# Patient Record
Sex: MALE | Race: OTHER | NOT HISPANIC OR LATINO | Employment: FULL TIME | URBAN - METROPOLITAN AREA
[De-identification: names, ages, dates, MRNs, and addresses within clinical notes are randomized per-mention and may not be internally consistent; named-entity substitution may affect disease eponyms.]

---

## 2023-05-24 ENCOUNTER — OFFICE VISIT (OUTPATIENT)
Dept: GASTROENTEROLOGY | Facility: CLINIC | Age: 43
End: 2023-05-24
Payer: COMMERCIAL

## 2023-05-24 VITALS
WEIGHT: 198 LBS | DIASTOLIC BLOOD PRESSURE: 92 MMHG | HEIGHT: 74 IN | BODY MASS INDEX: 25.41 KG/M2 | SYSTOLIC BLOOD PRESSURE: 134 MMHG | HEART RATE: 98 BPM

## 2023-05-24 DIAGNOSIS — R10.10 UPPER ABDOMINAL PAIN: Primary | ICD-10-CM

## 2023-05-24 DIAGNOSIS — R14.0 BLOATING: ICD-10-CM

## 2023-05-24 DIAGNOSIS — Z86.010 HISTORY OF COLON POLYPS: ICD-10-CM

## 2023-05-24 DIAGNOSIS — R68.81 EARLY SATIETY: ICD-10-CM

## 2023-05-24 PROCEDURE — 99204 OFFICE O/P NEW MOD 45 MIN: CPT | Mod: S$PBB,,, | Performed by: INTERNAL MEDICINE

## 2023-05-24 PROCEDURE — 99999 PR PBB SHADOW E&M-NEW PATIENT-LVL III: ICD-10-PCS | Mod: PBBFAC,,, | Performed by: INTERNAL MEDICINE

## 2023-05-24 PROCEDURE — 99204 PR OFFICE/OUTPT VISIT, NEW, LEVL IV, 45-59 MIN: ICD-10-PCS | Mod: S$PBB,,, | Performed by: INTERNAL MEDICINE

## 2023-05-24 PROCEDURE — 99999 PR PBB SHADOW E&M-NEW PATIENT-LVL III: CPT | Mod: PBBFAC,,, | Performed by: INTERNAL MEDICINE

## 2023-05-24 RX ORDER — PANTOPRAZOLE SODIUM 40 MG/1
1 TABLET, DELAYED RELEASE ORAL
COMMUNITY
Start: 2023-05-04

## 2023-05-25 ENCOUNTER — TELEPHONE (OUTPATIENT)
Dept: ENDOSCOPY | Facility: HOSPITAL | Age: 43
End: 2023-05-25

## 2023-05-25 NOTE — PROGRESS NOTES
[facility] Consultation Note    Reason for Consult:    Chief Complaint   Patient presents with    GI Problem       PCP:   Primary Doctor No    Referring MD:  Yasemin Self  No address on file      HPI:  Clay Mccallum is a 42 y.o. male here for evaluation of abdominal pain and bloating.  He is new to my clinic.  He was seen by his primary care physician twice in April for evaluation of upper abdominal pain that radiated to the right upper quadrant associated with bloating and fatigue.  He was treated with PPI.  He took Protonix for a month without improvement symptoms.  He took it every day.  An abdominal ultrasound was performed on April 20th that was generally unrevealing for cause of symptoms.  He was subsequently referred to us for further evaluation.  His brother-in-law is one of our neurologists who asked me to see him.    He reports ongoing symptoms with bloating and abdominal pain.  The pain occurs in the epigastric area and occasionally radiates to the right upper quadrant.  This had been sporadic starting a couple of years ago, but became constant in the past couple of months.  This is a daily problem now.  Feels fatigued and lethargic at times.  His eating patterns and routines have not changed.  Other than Protonix, he also tried Gas-X which did not help much.  Admits to some early satiety.  He has occasional acid reflux this started a couple of years ago.  This only occurs every few months.  Also has occasional constipation that occurs about once per month.  Constipation is not related to abdominal symptoms.  Uses Advil once every 2-4 weeks due to a herniated disc in his back.  He is a history of hemorrhoids with intermittent bleeding, but these did not require procedural intervention.  He treated these with topical medications and fiber supplements.  He denies current problems with his hemorrhoids.  He has a 3-year-old child at home.  He has experienced several GI illnesses  "since his child started going to .    He saw a GI doctor in [place] couple of years ago to request a colonoscopy.  He reports that a polyp was found during the colonoscopy, and he was told it was benign.  He was not told when to follow-up.  He can not recall the name of the gastroenterologist that performed the procedure.          ROS:  Constitutional: No fevers, chills, No weight loss, normal appetite  ENT: No congestion, rhinorrhea, or chronic sinus problems  CV: No chest pain or palpitations  Pulm: No cough, No shortness of breath  Ophtho: No vision changes or pain  GI: see HPI  Derm: No rash or lesions  MSK: No arthritis or joint swelling  : No dysuria, No frequent urination      Medical History:  has a past medical history of Herniated lumbar intervertebral disc.    Surgical History:  has a past surgical history that includes Vasectomy.    Family History: family history includes Colon polyps in his mother; Dementia in his maternal grandmother; Diabetes in his paternal grandfather and paternal grandmother; Heart failure in his maternal grandmother; No Known Problems in his father; Skin cancer in his paternal grandfather.    Social History:  reports that he has never smoked. He has never been exposed to tobacco smoke. He has never used smokeless tobacco. He reports current alcohol use. He reports that he does not use drugs.    Review of patient's allergies indicates:  No Known Allergies    Prior to Admission medications    Medication Sig Start Date End Date Taking? Authorizing Provider   pantoprazole (PROTONIX) 40 MG tablet Take 1 tablet by mouth before breakfast. 5/4/23   Historical Provider       Objective Findings:  Vital Signs:  BP (!) 134/92   Pulse 98   Ht 6' 2" (1.88 m)   Wt 89.8 kg (198 lb)   BMI 25.42 kg/m²   Body mass index is 25.42 kg/m².      Physical Exam:  General Appearance:  Well appearing in no acute distress, appears stated age  Head:  Normocephalic, atraumatic  Eyes:  No scleral " icterus or pallor, EOMI  Abdomen:  Soft, non tender, non distended. No hepatosplenomegaly, ascites, or mass  Extremities:  No clubbing, cyanosis, or edema  Skin:  No rash  Neurologic:  AAO x 4; CN II-XII intact        Labs:  Outside labs reviewed, dated 04/06/2023.  CBC, CMP, lipase, amylase, CRP, and TSH are within normal limits.            Imaging:  Ultrasound of the abdomen done 04/20/2023:  FINDINGS:  Pancreas: Visualized portions normal.   Liver length: 16 cm.   Liver appearance: Mild steatosis. No biliary ductal dilatation.   CBD: 4 mm.   Gallbladder: Normal.   Portal Vein: Patent with antegrade flow.   Right kidney length: 11 cm.   Right kidney appearance: Normal.   Left kidney length: 11 cm.   Left kidney appearance: Normal.   Spleen: 11 cm, homogeneous in appearance.   Aorta: Normal.   IVC: Normal.   Other findings: None.     IMPRESSION:   1.   Mild hepatic steatosis. Correlate with transaminases per                    Assessment:  Clay Mccallum is a 42 y.o. male with:  1. Upper abdominal pain    2. Bloating    3. Early satiety    4. History of colon polyps      Gradual onset of upper abdominal discomfort in the epigastric to right upper quadrant area associated with bloating and early satiety that progressively worsened over the past couple of years.  Symptoms have become daily and persistent problem over the past couple of months.  No clear relationship to any specific foods or food triggers.  He has had no changes in his daily routines.  He only has rare NSAID use.  Also with rare symptoms of GERD.  Symptoms not related to bowel movements or bowel patterns, and generally has normal bowel movements with only intermittent constipation once a month.  No improvement with use of Protonix daily for a month.  No improvement with use of Gas-X.  A routine laboratory evaluation and ultrasound of the abdomen were unremarkable for cause of symptoms.  Mild steatosis was suggested, but this is nonspecific finding in  this setting.  His liver chemistries are within normal limits.  I do not appreciate any significant underlying liver disease or evidence of liver disease.  He notes reason frequency of gastrointestinal infections since his child started attending .  It is possible that recurrent GI infections have resulted in lingering and worsening symptoms over time.  SIBO is a consideration.  No significant family history of GI disorders.    He notes a history of a colon polyp that was removed during a colonoscopy a couple of years ago.  I do not have that report to review at this time.        Recommendations/Plan:  1. I will get a celiac serology panel  2. We will attempt to get records of his prior colonoscopy  3. I will arrange for an EGD      Follow-up pending results of above      Order summary:  Orders Placed This Encounter    Celiac Disease Panel         Thank you so much for allowing me to participate in the care of Clay Long MD

## 2023-05-25 NOTE — TELEPHONE ENCOUNTER
"Contacted the patient to schedule their procedure(s). The patient did not answer the call and left a voice message asking them to contact Endoscopy Scheduling as soon as possible at [phone].    Unable to Reach You Letter also sent    Perry Long MD  [facility]  Procedure: EGD     Diagnosis: Abdominal pain, bloating, and early satiety     Procedure Timin-4 weeks     *If within 4 weeks selected, please emiliana as high priority*     *If greater than 12 weeks, please select "5-12 weeks" and delay sending until 2 months prior to requested date*     Provider: Myself     Location: No Preference     Additional Scheduling Information: No scheduling concerns     Prep Specifications:N/A                   "

## 2023-06-14 ENCOUNTER — TELEPHONE (OUTPATIENT)
Dept: ENDOSCOPY | Facility: HOSPITAL | Age: 43
End: 2023-06-14
Payer: COMMERCIAL

## 2023-06-14 NOTE — TELEPHONE ENCOUNTER
Contacted patient regarding procedure(s) requested by referring provider, Dr. Long. Patient did not answer. Left message for the patient to return call to my direct number at [phone].

## 2023-06-20 ENCOUNTER — TELEPHONE (OUTPATIENT)
Dept: ENDOSCOPY | Facility: HOSPITAL | Age: 43
End: 2023-06-20
Payer: COMMERCIAL

## 2023-06-20 NOTE — TELEPHONE ENCOUNTER
Contacted the patient to schedule an EGD requested by Dr Long. The patient did not answer, I did  leave a voice message asking them to contact Endoscopy Scheduling  at [phone].

## 2023-06-22 ENCOUNTER — TELEPHONE (OUTPATIENT)
Dept: ENDOSCOPY | Facility: HOSPITAL | Age: 43
End: 2023-06-22
Payer: COMMERCIAL

## 2023-07-26 ENCOUNTER — TELEPHONE (OUTPATIENT)
Dept: ADMINISTRATIVE | Facility: HOSPITAL | Age: 43
End: 2023-07-26
Payer: COMMERCIAL

## 2023-07-27 ENCOUNTER — PATIENT MESSAGE (OUTPATIENT)
Dept: DERMATOLOGY | Facility: CLINIC | Age: 43
End: 2023-07-27

## 2023-07-27 ENCOUNTER — OFFICE VISIT (OUTPATIENT)
Dept: DERMATOLOGY | Facility: CLINIC | Age: 43
End: 2023-07-27
Payer: COMMERCIAL

## 2023-07-27 DIAGNOSIS — D18.01 CHERRY ANGIOMA: ICD-10-CM

## 2023-07-27 DIAGNOSIS — L70.9 ACNE, UNSPECIFIED ACNE TYPE: ICD-10-CM

## 2023-07-27 DIAGNOSIS — L82.1 SEBORRHEIC KERATOSES: ICD-10-CM

## 2023-07-27 DIAGNOSIS — D23.9 DERMATOFIBROMA: ICD-10-CM

## 2023-07-27 DIAGNOSIS — D48.5 NEOPLASM OF UNCERTAIN BEHAVIOR OF SKIN: Primary | ICD-10-CM

## 2023-07-27 DIAGNOSIS — D22.9 MULTIPLE BENIGN NEVI: ICD-10-CM

## 2023-07-27 DIAGNOSIS — L43.9 LICHEN PLANUS: ICD-10-CM

## 2023-07-27 PROCEDURE — 1159F PR MEDICATION LIST DOCUMENTED IN MEDICAL RECORD: ICD-10-PCS | Mod: CPTII,S$GLB,, | Performed by: STUDENT IN AN ORGANIZED HEALTH CARE EDUCATION/TRAINING PROGRAM

## 2023-07-27 PROCEDURE — 99204 PR OFFICE/OUTPT VISIT, NEW, LEVL IV, 45-59 MIN: ICD-10-PCS | Mod: 25,S$GLB,, | Performed by: STUDENT IN AN ORGANIZED HEALTH CARE EDUCATION/TRAINING PROGRAM

## 2023-07-27 PROCEDURE — 88342 IMHCHEM/IMCYTCHM 1ST ANTB: CPT | Performed by: PATHOLOGY

## 2023-07-27 PROCEDURE — 99204 OFFICE O/P NEW MOD 45 MIN: CPT | Mod: 25,S$GLB,, | Performed by: STUDENT IN AN ORGANIZED HEALTH CARE EDUCATION/TRAINING PROGRAM

## 2023-07-27 PROCEDURE — 99999 PR PBB SHADOW E&M-EST. PATIENT-LVL III: CPT | Mod: PBBFAC,,, | Performed by: STUDENT IN AN ORGANIZED HEALTH CARE EDUCATION/TRAINING PROGRAM

## 2023-07-27 PROCEDURE — 88342 CHG IMMUNOCYTOCHEMISTRY: ICD-10-PCS | Mod: 26,,, | Performed by: PATHOLOGY

## 2023-07-27 PROCEDURE — 99999 PR PBB SHADOW E&M-EST. PATIENT-LVL III: ICD-10-PCS | Mod: PBBFAC,,, | Performed by: STUDENT IN AN ORGANIZED HEALTH CARE EDUCATION/TRAINING PROGRAM

## 2023-07-27 PROCEDURE — 1160F RVW MEDS BY RX/DR IN RCRD: CPT | Mod: CPTII,S$GLB,, | Performed by: STUDENT IN AN ORGANIZED HEALTH CARE EDUCATION/TRAINING PROGRAM

## 2023-07-27 PROCEDURE — 1160F PR REVIEW ALL MEDS BY PRESCRIBER/CLIN PHARMACIST DOCUMENTED: ICD-10-PCS | Mod: CPTII,S$GLB,, | Performed by: STUDENT IN AN ORGANIZED HEALTH CARE EDUCATION/TRAINING PROGRAM

## 2023-07-27 PROCEDURE — 88342 IMHCHEM/IMCYTCHM 1ST ANTB: CPT | Mod: 26,,, | Performed by: PATHOLOGY

## 2023-07-27 PROCEDURE — 11102 TANGNTL BX SKIN SINGLE LES: CPT | Mod: S$GLB,,, | Performed by: STUDENT IN AN ORGANIZED HEALTH CARE EDUCATION/TRAINING PROGRAM

## 2023-07-27 PROCEDURE — 88305 TISSUE EXAM BY PATHOLOGIST: ICD-10-PCS | Mod: 26,,, | Performed by: PATHOLOGY

## 2023-07-27 PROCEDURE — 1159F MED LIST DOCD IN RCRD: CPT | Mod: CPTII,S$GLB,, | Performed by: STUDENT IN AN ORGANIZED HEALTH CARE EDUCATION/TRAINING PROGRAM

## 2023-07-27 PROCEDURE — 88305 TISSUE EXAM BY PATHOLOGIST: CPT | Mod: 26,,, | Performed by: PATHOLOGY

## 2023-07-27 PROCEDURE — 11102 PR TANGENTIAL BIOPSY, SKIN, SINGLE LESION: ICD-10-PCS | Mod: S$GLB,,, | Performed by: STUDENT IN AN ORGANIZED HEALTH CARE EDUCATION/TRAINING PROGRAM

## 2023-07-27 PROCEDURE — 88305 TISSUE EXAM BY PATHOLOGIST: CPT | Performed by: PATHOLOGY

## 2023-07-27 RX ORDER — TACROLIMUS 1 MG/G
OINTMENT TOPICAL 2 TIMES DAILY
Qty: 30 G | Refills: 2 | Status: SHIPPED | OUTPATIENT
Start: 2023-07-27 | End: 2023-08-25 | Stop reason: SDUPTHER

## 2023-07-27 NOTE — PATIENT INSTRUCTIONS
Shave Biopsy Wound Care    Your doctor has performed a shave biopsy today.  A band aid and vaseline ointment has been placed over the site.  This should remain in place for NO LONGER THAN 48 hours.  It is fine to remove the bandaid after 24 hours, if the area is no longer bleeding. It is recommended that you keep the area dry (do not wet)) for the first 24 hours.  After 24 hours, wash the area with warm soap and water and apply Vaseline jelly.  Many patients prefer to use Neosporin or Bacitracin ointment.  This is acceptable; however, know that you can develop an allergy to this medication even if you have used it safely for years.  It is important to keep the area moist.  Letting it dry out and get air slows healing time, and will worsen the scar.        If you notice increasing redness, tenderness, pain, or yellow drainage at the biopsy site, please notify your doctor.  These are signs of an infection.    If your biopsy site is bleeding, apply firm pressure for 15 minutes straight.  Repeat for another 15 minutes, if it is still bleeding.   If the surgical site continues to bleed, then please contact your doctor.      For [facility] users:   You will receive your biopsy results in [facility] as soon as they are available. Please be assured that your physician/provider will review your results and will then determine what further treatment, evaluation, or planning is required. You should be contacted by your physician's/provider's office within 5 business days of receiving your results; If not, please reach out to directly. This is one more way [employer] is putting you first.     [facility] [place] [address]/ [phone] FAX/ [email or web address]

## 2023-07-27 NOTE — PROGRESS NOTES
Subjective:      Patient ID:  Clay Mccallum is a 42 y.o. male who presents for   Chief Complaint   Patient presents with    Skin Check     TBSE     History of Present Illness: The patient presents to [facility] care.     Hx of skin cancer on paternal side.     Other skin complaints: Patient with new complaint of lesion(s)  Location: spot on groin  Duration: 3 weeks  Symptoms: none  Relieving factors/Previous treatments: none    Patient with new complaint of lesion(s)  Location: acne on chest and back  Duration: since age 15-16  Symptoms: n/a  Relieving factors/Previous treatments: body wash with salicylic acid           Review of Systems   Constitutional:  Negative for fever, chills and fatigue.   Skin:  Positive for activity-related sunscreen use and wears hat. Negative for daily sunscreen use and recent sunburn.   Hematologic/Lymphatic: Bruises/bleeds easily.     Objective:   Physical Exam   Constitutional: He appears well-developed and well-nourished. No distress.   Neurological: He is alert and oriented to person, place, and time. He is not disoriented.   Psychiatric: He has a normal mood and affect.   Skin:   Areas Examined (abnormalities noted in diagram):   Scalp / Hair Palpated and Inspected  Head / Face Inspection Performed  Neck Inspection Performed  Chest / Axilla Inspection Performed  Abdomen Inspection Performed  Genitals / Buttocks / Groin Inspection Performed  Back Inspection Performed  RUE Inspected  LUE Inspection Performed  RLE Inspected  LLE Inspection Performed  Nails and Digits Inspection Performed          Diagram Legend     Erythematous scaling macule/papule c/w actinic keratosis       Vascular papule c/w angioma      Pigmented verrucoid papule/plaque c/w seborrheic keratosis      Yellow umbilicated papule c/w sebaceous hyperplasia      Irregularly shaped tan macule c/w lentigo     1-2 mm smooth white papules consistent with Milia      Movable subcutaneous cyst with punctum c/w epidermal  inclusion cyst      Subcutaneous movable cyst c/w pilar cyst      Firm pink to brown papule c/w dermatofibroma      Pedunculated fleshy papule(s) c/w skin tag(s)      Evenly pigmented macule c/w junctional nevus     Mildly variegated pigmented, slightly irregular-bordered macule c/w mildly atypical nevus      Flesh colored to evenly pigmented papule c/w intradermal nevus       Pink pearly papule/plaque c/w basal cell carcinoma      Erythematous hyperkeratotic cursted plaque c/w SCC      Surgical scar with no sign of skin cancer recurrence      Open and closed comedones      Inflammatory papules and pustules      Verrucoid papule consistent consistent with wart     Erythematous eczematous patches and plaques     Dystrophic onycholytic nail with subungual debris c/w onychomycosis     Umbilicated papule    Erythematous-base heme-crusted tan verrucoid plaque consistent with inflamed seborrheic keratosis     Erythematous Silvery Scaling Plaque c/w Psoriasis     See annotation            Assessment / Plan:      Pathology Orders:       Normal Orders This Visit    Specimen to Pathology, Dermatology     Questions:    Procedure Type: Dermatology and skin neoplasms    Number of Specimens: 1    ------------------------: -------------------------    Spec 1 Procedure: Biopsy    Spec 1 Clinical Impression: nevus vs melanoma    Spec 1 Source: right chest    Release to patient: Immediate          Neoplasm of uncertain behavior of skin  -     Specimen to Pathology, Dermatology    Shave biopsy procedure note:  Shave biopsy performed after verbal consent including risk of infection, scar, recurrence, need for additional treatment of site. Area prepped with alcohol, anesthetized with approximately 1.0cc of 1% lidocaine with epinephrine. Lesional tissue shaved with razor blade. Hemostasis achieved with application of aluminum chloride followed by hyfrecation. No complications. Dressing applied. Wound care explained.    Seborrheic  keratoses  Cherry angioma  Reassurance given to patient. No treatment is necessary.   Treatment of benign, asymptomatic lesions may be considered cosmetic.    Multiple benign nevi  Discussed ABCDE's of nevi.  Monitor for new mole or moles that are becoming bigger, darker, irritated, or developing irregular borders. Instructed patient to observe lesion(s) for changes and follow up in clinic if changes are noted. Patient to monitor skin at home for new or changing lesions.     Dermatofibroma  Reassurance given to patient. No treatment is necessary.     Lichen planus--violaceous flat topped thin papule on penile shaft x 3 weeks. Ddx somewhat nonspecific. Could be c/w lichen planus. Will treat empirically with protopic and have patient f/u if lesion is not resolving or if it is growing or changing  -     tacrolimus (PROTOPIC) 0.1 % ointment; Apply topically 2 (two) times daily. Apply to spot in genital area  Dispense: 30 g; Refill: 2      Acne vs folliculitis--back  - recommend BPO 10 % wash daily or 10% sulfur wash qd         Follow up in about 1 year (around 7/27/2024) for TBSE.

## 2023-08-03 LAB
FINAL PATHOLOGIC DIAGNOSIS: NORMAL
Lab: NORMAL

## 2023-08-25 ENCOUNTER — OFFICE VISIT (OUTPATIENT)
Dept: DERMATOLOGY | Facility: CLINIC | Age: 43
End: 2023-08-25
Payer: COMMERCIAL

## 2023-08-25 DIAGNOSIS — L43.9 LICHEN PLANUS: Primary | ICD-10-CM

## 2023-08-25 PROCEDURE — 99213 PR OFFICE/OUTPT VISIT, EST, LEVL III, 20-29 MIN: ICD-10-PCS | Mod: S$GLB,,, | Performed by: STUDENT IN AN ORGANIZED HEALTH CARE EDUCATION/TRAINING PROGRAM

## 2023-08-25 PROCEDURE — 99999 PR PBB SHADOW E&M-EST. PATIENT-LVL III: ICD-10-PCS | Mod: PBBFAC,,, | Performed by: STUDENT IN AN ORGANIZED HEALTH CARE EDUCATION/TRAINING PROGRAM

## 2023-08-25 PROCEDURE — 1159F MED LIST DOCD IN RCRD: CPT | Mod: CPTII,S$GLB,, | Performed by: STUDENT IN AN ORGANIZED HEALTH CARE EDUCATION/TRAINING PROGRAM

## 2023-08-25 PROCEDURE — 1160F RVW MEDS BY RX/DR IN RCRD: CPT | Mod: CPTII,S$GLB,, | Performed by: STUDENT IN AN ORGANIZED HEALTH CARE EDUCATION/TRAINING PROGRAM

## 2023-08-25 PROCEDURE — 1160F PR REVIEW ALL MEDS BY PRESCRIBER/CLIN PHARMACIST DOCUMENTED: ICD-10-PCS | Mod: CPTII,S$GLB,, | Performed by: STUDENT IN AN ORGANIZED HEALTH CARE EDUCATION/TRAINING PROGRAM

## 2023-08-25 PROCEDURE — 99213 OFFICE O/P EST LOW 20 MIN: CPT | Mod: S$GLB,,, | Performed by: STUDENT IN AN ORGANIZED HEALTH CARE EDUCATION/TRAINING PROGRAM

## 2023-08-25 PROCEDURE — 99999 PR PBB SHADOW E&M-EST. PATIENT-LVL III: CPT | Mod: PBBFAC,,, | Performed by: STUDENT IN AN ORGANIZED HEALTH CARE EDUCATION/TRAINING PROGRAM

## 2023-08-25 PROCEDURE — 1159F PR MEDICATION LIST DOCUMENTED IN MEDICAL RECORD: ICD-10-PCS | Mod: CPTII,S$GLB,, | Performed by: STUDENT IN AN ORGANIZED HEALTH CARE EDUCATION/TRAINING PROGRAM

## 2023-08-25 RX ORDER — TACROLIMUS 1 MG/G
OINTMENT TOPICAL 2 TIMES DAILY
Qty: 30 G | Refills: 2 | Status: SHIPPED | OUTPATIENT
Start: 2023-08-25

## 2023-08-25 NOTE — PROGRESS NOTES
Subjective:      Patient ID:  Clay Mccallum is a 43 y.o. male who presents for   Chief Complaint   Patient presents with    Follow-up     Patient is here for f/u of last appointment.  Would like bx results explained (locked out of patient portal).   Also would like lichen planus on penile shaft rechecked. Pt has not picked up protopix px, might need it resent to pharmacy.  Patient also c/o acne/follic on back. Using SA wash to treat. Could not see instructions to use BPO 10% wash or sulfur wash 10% because he was locked out of the portal.      Follow-up      Seen 7/27/23 with lesion on penile shaft. CLinically favored LP. Plan to treat with protopic but patient unable to get medication due to traveling. It has not worsened or grown. He is unsure if it has gotten better    Review of Systems    Objective:   Physical Exam   Skin:               Diagram Legend     Erythematous scaling macule/papule c/w actinic keratosis       Vascular papule c/w angioma      Pigmented verrucoid papule/plaque c/w seborrheic keratosis      Yellow umbilicated papule c/w sebaceous hyperplasia      Irregularly shaped tan macule c/w lentigo     1-2 mm smooth white papules consistent with Milia      Movable subcutaneous cyst with punctum c/w epidermal inclusion cyst      Subcutaneous movable cyst c/w pilar cyst      Firm pink to brown papule c/w dermatofibroma      Pedunculated fleshy papule(s) c/w skin tag(s)      Evenly pigmented macule c/w junctional nevus     Mildly variegated pigmented, slightly irregular-bordered macule c/w mildly atypical nevus      Flesh colored to evenly pigmented papule c/w intradermal nevus       Pink pearly papule/plaque c/w basal cell carcinoma      Erythematous hyperkeratotic cursted plaque c/w SCC      Surgical scar with no sign of skin cancer recurrence      Open and closed comedones      Inflammatory papules and pustules      Verrucoid papule consistent consistent with wart     Erythematous eczematous  patches and plaques     Dystrophic onycholytic nail with subungual debris c/w onychomycosis     Umbilicated papule    Erythematous-base heme-crusted tan verrucoid plaque consistent with inflamed seborrheic keratosis     Erythematous Silvery Scaling Plaque c/w Psoriasis     See annotation      Assessment / Plan:        Lichen planus--Initially with violaceous flat topped thin papule on penile shaft x 3 weeks. Ddx somewhat nonspecific but favored lichen planus. Has not yet treated but today less erythematous and pigment incontinence on dermoscopy. Still favor lichen planus. May be spontaneously resolving. Will rx protopic and f/u  - Discussed rpr to r/o syphilis (low suspicion as it is not an ulcer) but patient is in monogamous relationship with wife and deferred  -     tacrolimus (PROTOPIC) 0.1 % ointment; Apply topically 2 (two) times daily. Apply to spot in genital area  Dispense: 30 g; Refill: 2       Follow up in about 6 weeks (around 10/6/2023).

## 2025-08-29 ENCOUNTER — TELEPHONE (OUTPATIENT)
Dept: DERMATOLOGY | Facility: CLINIC | Age: 45
End: 2025-08-29
Payer: COMMERCIAL